# Patient Record
Sex: MALE | Race: BLACK OR AFRICAN AMERICAN | NOT HISPANIC OR LATINO | Employment: FULL TIME | URBAN - METROPOLITAN AREA
[De-identification: names, ages, dates, MRNs, and addresses within clinical notes are randomized per-mention and may not be internally consistent; named-entity substitution may affect disease eponyms.]

---

## 2024-04-30 ENCOUNTER — HOSPITAL ENCOUNTER (EMERGENCY)
Facility: HOSPITAL | Age: 39
Discharge: HOME | End: 2024-05-01
Attending: STUDENT IN AN ORGANIZED HEALTH CARE EDUCATION/TRAINING PROGRAM

## 2024-04-30 VITALS
WEIGHT: 140 LBS | HEART RATE: 100 BPM | HEIGHT: 66 IN | DIASTOLIC BLOOD PRESSURE: 90 MMHG | RESPIRATION RATE: 18 BRPM | SYSTOLIC BLOOD PRESSURE: 128 MMHG | TEMPERATURE: 99 F | OXYGEN SATURATION: 96 % | BODY MASS INDEX: 22.5 KG/M2

## 2024-04-30 DIAGNOSIS — R51.9 ACUTE NONINTRACTABLE HEADACHE, UNSPECIFIED HEADACHE TYPE: Primary | ICD-10-CM

## 2024-04-30 PROCEDURE — 99281 EMR DPT VST MAYX REQ PHY/QHP: CPT

## 2024-04-30 NOTE — Clinical Note
"Mare"Shaggy Rivera was seen and treated in our emergency department on 4/30/2024.  He may return to work on 05/02/2024.  Pt seen in this ED on 4/30-5/1, he is safe for return to work.      If you have any questions or concerns, please don't hesitate to call.      Juan David Bro PA-C"

## 2024-05-01 NOTE — ED NOTES
"Patient presents to ED secondary to intermittent right sided headache x3 days. Pain resolves with ibuprofen but comes back. Denies accompanying symptoms. 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 Refuses covid or flu swab during assessment. Denies headache at this moment. States, "I am just trying to figure out why I am having them and get a quick little check up." Upon further discussion, patient reports "I just need a doctors note to return to work because I missed Sunday because of headache." NAD ntoed.   "

## 2024-05-01 NOTE — DISCHARGE INSTRUCTIONS
Return to this ED if you experience worsening or persistent headache despite treatment.    Follow up and establish care with a local primary care provider using information provided.

## 2024-05-01 NOTE — ED PROVIDER NOTES
Encounter Date: 4/30/2024       History     Chief Complaint   Patient presents with    Headache     Patient arrives with headache on left side of head, ongoing for the past 3 days. Has been taking home meds with slight relief. Denies congestion or vision changes.     39yo M smoker presents to ED with chief complaint headache.    Admits to mild HA x 2d, took Ibuprofen with resolution of HA. States headache to L forehead. Admits to sinus pain, pressure, nasal congestion recently.  Denies fever.  No cough.  Denies history of similar headaches.  Denies worst headache of life.  No associated visual disturbance, no reported photophobia, no neck pain or stiffness.  Denies head trauma.  No exacerbating factors.  No radiation of symptoms.    Denies significant pmh  No daily rx      Review of patient's allergies indicates:  No Known Allergies  No past medical history on file.  No past surgical history on file.  No family history on file.     Review of Systems   Constitutional:  Negative for chills and fever.   HENT:  Positive for congestion, sinus pressure and sinus pain.    Eyes:  Negative for photophobia and visual disturbance.   Respiratory:  Negative for cough.    Musculoskeletal:  Negative for myalgias.   Neurological:  Positive for headaches. Negative for syncope.       Physical Exam     Initial Vitals [04/30/24 2221]   BP Pulse Resp Temp SpO2   (!) 128/90 100 18 98.9 °F (37.2 °C) 96 %      MAP       --         Physical Exam    Nursing note and vitals reviewed.  Constitutional: He appears well-developed and well-nourished. He is not diaphoretic. No distress.   HENT:   Head: Normocephalic and atraumatic.   Neck: Neck supple.   Normal range of motion.  Pulmonary/Chest: No respiratory distress.   Musculoskeletal:      Cervical back: Normal range of motion and neck supple.     Neurological: He is alert and oriented to person, place, and time. GCS score is 15. GCS eye subscore is 4. GCS verbal subscore is 5. GCS motor  subscore is 6.   Skin: Skin is warm.   Psychiatric: He has a normal mood and affect. Thought content normal.         ED Course   Procedures  Labs Reviewed - No data to display       Imaging Results    None          Medications - No data to display  Medical Decision Making  Differential diagnosis:  Headache disorder, sinusitis, tension headache, migraine    Amount and/or Complexity of Data Reviewed  Discussion of management or test interpretation with external provider(s): After discussion, patient states no longer with any headache.  He has requesting a work excuse.  He has no complaints.  He admits to resolution after taking ibuprofen.  Offered medications for what sounds like a sinus type headache, however patient states does not wish to have any medications.  He is willing to be referred to family medicine doctor for re-evaluation of any persistent symptoms.  Discussed interim return precautions with patient.  He does feel comfortable with discharge and outpatient follow-up.  Return precautions discussed.                                      Clinical Impression:  Final diagnoses:  [R51.9] Acute nonintractable headache, unspecified headache type (Primary)          ED Disposition Condition    Discharge Stable          ED Prescriptions    None       Follow-up Information       Follow up With Specialties Details Why Contact Info    [facility] -  Schedule an appointment as soon as possible for a visit  To establish primary care physician, for reevaluation [address]  [place] [address]  [phone]      [facility] Schedule an appointment as soon as possible for a visit  To establish primary care physician, For reevaluation [address] [facility] [place] [address]  [phone]               Juan David Bro, BRAYAN  05/01/24 0525

## 2024-05-20 ENCOUNTER — HOSPITAL ENCOUNTER (EMERGENCY)
Facility: HOSPITAL | Age: 39
Discharge: HOME | End: 2024-05-20
Attending: EMERGENCY MEDICINE

## 2024-05-20 VITALS
HEIGHT: 66 IN | RESPIRATION RATE: 20 BRPM | OXYGEN SATURATION: 99 % | TEMPERATURE: 99 F | DIASTOLIC BLOOD PRESSURE: 100 MMHG | BODY MASS INDEX: 21.69 KG/M2 | SYSTOLIC BLOOD PRESSURE: 163 MMHG | WEIGHT: 135 LBS | HEART RATE: 78 BPM

## 2024-05-20 DIAGNOSIS — S60.221A CONTUSION OF RIGHT HAND, INITIAL ENCOUNTER: ICD-10-CM

## 2024-05-20 DIAGNOSIS — M79.673 FOOT PAIN: ICD-10-CM

## 2024-05-20 DIAGNOSIS — M25.579 ANKLE PAIN: ICD-10-CM

## 2024-05-20 DIAGNOSIS — M87.9 OSTEONECROSIS: Primary | ICD-10-CM

## 2024-05-20 DIAGNOSIS — S93.401A SPRAIN OF RIGHT ANKLE, UNSPECIFIED LIGAMENT, INITIAL ENCOUNTER: ICD-10-CM

## 2024-05-20 PROCEDURE — 99284 EMERGENCY DEPT VISIT MOD MDM: CPT | Mod: 25

## 2024-05-20 RX ORDER — LIDOCAINE 50 MG/G
1 PATCH TOPICAL DAILY
Qty: 15 PATCH | Refills: 0 | Status: SHIPPED | OUTPATIENT
Start: 2024-05-20

## 2024-05-20 RX ORDER — METHOCARBAMOL 500 MG/1
1000 TABLET, FILM COATED ORAL 3 TIMES DAILY
Qty: 30 TABLET | Refills: 0 | Status: SHIPPED | OUTPATIENT
Start: 2024-05-20 | End: 2024-05-25

## 2024-05-20 RX ORDER — NAPROXEN 500 MG/1
500 TABLET ORAL 2 TIMES DAILY
Qty: 30 TABLET | Refills: 0 | Status: SHIPPED | OUTPATIENT
Start: 2024-05-20

## 2024-05-20 NOTE — ED TRIAGE NOTES
Pt presents to ED with complaint of right foot pain and right hand pain due a fall that occurred this morning,

## 2024-05-20 NOTE — Clinical Note
"Mare"Shaggy Rivera was seen and treated in our emergency department on 5/20/2024.  He may return to work on 05/22/2024.       If you have any questions or concerns, please don't hesitate to call.      Patrick Quinonez PA-C"

## 2024-05-20 NOTE — ED PROVIDER NOTES
Encounter Date: 5/20/2024       History     Chief Complaint   Patient presents with    Fall     Pt reports losing his balance and falling this morning. Pt reports right foot pain and right hand pain. Pt denies head injury/trauma or LOC.      The history is provided by the patient and medical records.   39-year-old male no pertinent past medical history presenting to the emergency department today complaining right foot and ankle and right hand pain after a trip and fall this morning.  Patient states he tripped and fell landing on his right hand and twisting his ankle.  Denies head injury, loss of consciousness, nausea or vomiting.  Denies any medication prior to arrival.  Denies any fever, chest pain, shortness for breath, headache, dizziness, lightheadedness, weakness, numbness or tingling.  Review of patient's allergies indicates:  No Known Allergies  No past medical history on file.  No past surgical history on file.  No family history on file.     Review of Systems   Constitutional:  Negative for chills, diaphoresis, fatigue and fever.   HENT:  Negative for congestion, ear discharge, ear pain, rhinorrhea, sore throat and trouble swallowing.    Eyes:  Negative for photophobia, redness and visual disturbance.   Respiratory:  Negative for cough and shortness of breath.    Cardiovascular:  Negative for chest pain, palpitations and leg swelling.   Gastrointestinal:  Negative for abdominal pain, diarrhea, nausea and vomiting.   Genitourinary:  Negative for difficulty urinating, dysuria, flank pain, frequency and hematuria.   Musculoskeletal:  Positive for arthralgias (Right ankle) and myalgias (Right foot, right hand). Negative for back pain, gait problem, joint swelling, neck pain and neck stiffness.   Skin:  Negative for color change, pallor, rash and wound.   Neurological:  Negative for dizziness, syncope, weakness, light-headedness, numbness and headaches.   Hematological:  Does not bruise/bleed easily.        Physical Exam     Initial Vitals [05/20/24 1320]   BP Pulse Resp Temp SpO2   (!) 163/100 78 20 98.5 °F (36.9 °C) 99 %      MAP       --         Physical Exam    Nursing note and vitals reviewed.  Constitutional: He appears well-developed and well-nourished. He is not diaphoretic. He does not appear ill. No distress.   HENT:   Head: Normocephalic and atraumatic. Head is without raccoon's eyes, without Segura's sign, without abrasion, without contusion and without laceration.   Right Ear: Tympanic membrane, external ear and ear canal normal.   Left Ear: Tympanic membrane, external ear and ear canal normal.   Nose: Nose normal. No rhinorrhea, sinus tenderness, nasal deformity, septal deviation or nasal septal hematoma. No epistaxis.   Mouth/Throat: Uvula is midline, oropharynx is clear and moist and mucous membranes are normal.   Eyes: Conjunctivae and EOM are normal. Pupils are equal, round, and reactive to light. Right eye exhibits no discharge. Left eye exhibits no discharge. No scleral icterus.   Neck: Trachea normal. Neck supple.   Normal range of motion.   Full passive range of motion without pain.     Cardiovascular:  Normal rate, regular rhythm, normal heart sounds, intact distal pulses and normal pulses.     Exam reveals no distant heart sounds and no friction rub.       Pulmonary/Chest: Effort normal and breath sounds normal. No respiratory distress. He exhibits no tenderness and no bony tenderness.   Abdominal: Abdomen is soft. Bowel sounds are normal. He exhibits no distension, no pulsatile midline mass and no mass. There is no splenomegaly. There is no abdominal tenderness.   No right CVA tenderness.  No left CVA tenderness. There is no rebound and no guarding.   Musculoskeletal:      Right shoulder: Normal.      Left shoulder: Normal.      Right elbow: Normal.      Left elbow: Normal.      Right wrist: Normal.      Left wrist: Normal.      Right hand: Bony tenderness (1st MCP) present. No swelling,  deformity, lacerations or tenderness. Decreased range of motion (Secondary to pain, full passive range of motion). Normal strength. Normal sensation. There is no disruption of two-point discrimination. Normal capillary refill. Normal pulse.      Left hand: Normal.      Cervical back: Normal, full passive range of motion without pain, normal range of motion and neck supple. No edema, erythema or rigidity. No spinous process tenderness or muscular tenderness. Normal range of motion.      Thoracic back: Normal.      Lumbar back: Normal.      Right hip: Normal.      Left hip: Normal.      Right knee: Normal.      Left knee: Normal.      Right lower leg: Normal.      Left lower leg: Normal.      Right ankle: Normal.      Right Achilles Tendon: Normal.      Left ankle: Normal.      Left Achilles Tendon: Normal.      Right foot: Normal range of motion and normal capillary refill. Tenderness (dorsal and plantar) present. No swelling, deformity, bunion, Charcot foot, foot drop, prominent metatarsal heads, laceration, bony tenderness or crepitus. Normal pulse.      Left foot: Normal.      Comments: Focused exam of the right hand:  Tenderness of the 1st MCP full passive range of motion sensation intact good cap refill.    Focused exam of the right ankle and foot:  Tenderness to the dorsal and plantar aspect of the foot pain with range of motion although full range without limitation.  5/5 strength.  2+ distal pulses.  Sensation intact.    Otherwise full range of motion of all other upper and lower extremities bilaterally neurovascular intact 2+ distal pulses 5/5 strength.     Neurological: He is alert and oriented to person, place, and time. He has normal strength. No cranial nerve deficit or sensory deficit. He displays a negative Romberg sign. Coordination and gait normal.   Skin: Skin is warm and dry. Capillary refill takes less than 2 seconds. No bruising, no ecchymosis and no rash noted. No erythema.   Psychiatric: He has  a normal mood and affect. His speech is normal and behavior is normal. Thought content normal.         ED Course   Procedures  Labs Reviewed - No data to display       Imaging Results              X-Ray Foot Complete Right (Final result)  Result time 05/20/24 14:04:11      Final result by George Marcus Jr., MD (05/20/24 14:04:11)                   Impression:      Findings suggesting possibility of osteonecrosis of the 1st metatarsal head.  Correlate clinically and consider MRI for further evaluation      Electronically signed by: George Jay Jr  Date:    05/20/2024  Time:    14:04               Narrative:    EXAMINATION:  XR FOOT COMPLETE 3 VIEW RIGHT    CLINICAL HISTORY:  Pain in unspecified foot    TECHNIQUE:  XR FOOT COMPLETE 3 VIEW RIGHT    COMPARISON:  None    FINDINGS:  Joint spaces are preserved.  There is some flattening and subchondral lucency involving the 1st metatarsal head.  There is no evidence of fracture or soft tissue swelling.                                       X-Ray Hand 3 view Right (Final result)  Result time 05/20/24 14:04:06      Final result by Yovani Gonsales MD (05/20/24 14:04:06)                   Impression:      No acute displaced fracture-dislocation identified.      Electronically signed by: Yovani Gonsales MD  Date:    05/20/2024  Time:    14:04               Narrative:    EXAMINATION:  XR HAND COMPLETE 3 VIEW RIGHT    CLINICAL HISTORY:  1st MCP pain;    TECHNIQUE:  PA, lateral, and oblique views of the right hand were performed.    COMPARISON:  None    FINDINGS:  Bones are well mineralized. Overall alignment is within normal limits. No acute displaced fracture, dislocation or destructive osseous process. Joint spaces appear relatively maintained. No subcutaneous emphysema or radiopaque foreign body.                                       X-Ray Ankle Complete Right (Final result)  Result time 05/20/24 14:04:52      Final result by Yovani Gonsales MD (05/20/24 14:04:52)                    Impression:      No acute displaced fracture-dislocation identified.      Electronically signed by: Yovani Gonsales MD  Date:    05/20/2024  Time:    14:04               Narrative:    EXAMINATION:  XR ANKLE COMPLETE 3 VIEW RIGHT    CLINICAL HISTORY:  Pain in unspecified ankle and joints of unspecified foot    TECHNIQUE:  AP, lateral, and oblique images of the right ankle were performed.    COMPARISON:  None    FINDINGS:  Bones are well mineralized. Overall alignment is within normal limits.  Ankle mortise appears intact.  No acute displaced fracture, dislocation or destructive osseous process. Joint spaces appear relatively maintained. No subcutaneous emphysema or radiopaque foreign body.                                       Medications - No data to display  Medical Decision Making  39-year-old male no pertinent past medical history presenting to the emergency department today complaining right foot and ankle and right hand pain after a trip and fall this morning.  Patient states he tripped and fell landing on his right hand and twisting his ankle.  Denies head injury, loss of consciousness, nausea or vomiting.  Denies any medication prior to arrival.  Denies any fever, chest pain, shortness for breath, headache, dizziness, lightheadedness, weakness, numbness or tingling.  Patient's chart and medical history reviewed.  Patient's vitals reviewed.  They are afebrile, no respiratory distress, nontoxic-appearing in the ED.  Differential diagnosis is considered the following.  - Septic Arthritis, Gout, Osteomyelitis: considered with pain, although unlikely without overlying erythema and swelling/edema, patient is afebrile  - Fracture/Dislocation: considered with pain, imaging ordered for further evaluation  - Contusion/Sprain/Strain: considered with pain with ROM   - Compartment Syndrome: unlikely with 2+ distal pulses, no pallor, no paresthesias, no woody induration.   Physical exam as noted above.   X-ray with no acute fracture or dislocation.  Noted possible suspect osteonecrosis of the 1st MCP of the foot.  Patient with no tenderness in his area on exam with full range of motion sensation intact.  Discussed following up with a primary care physician for re-evaluation of this future care.  Patient will be discharged home with muscle relaxers and NSAIDs.  Patient agrees with the plan does have any questions at this time.  Patient's hands and ankle Ace wrapped for compression discussed rice therapy.  At this time I'll discharge home to follow up with primary care physician in the next 1-2 days for further evaluation.  If the pain continues the pt will need to see Ortho for further evaluation.  The patient is comfortable with this plan and comfortable going home at this time. After taking into careful account the historical factors and physical exam findings of the patient's presentation today, in conjunction with the empirical and objective data obtained on ED workup, no acute emergent medical condition has been identified. The patient appears to be low risk for an emergent medical condition and I feel it is safe and appropriate at this time for the patient to be discharged to follow-up as detailed in their discharge instructions for reevaluation and possible continued outpatient workup and management. I have discussed the specifics of the workup with the patient and the patient has verbalized understanding of the details of the workup, the diagnosis, the treatment plan, and the need for outpatient follow-up.  Although the patient has no emergent etiology today this does not preclude the development of an emergent condition so in addition, I have advised the patient that they can return to the ED and/or activate EMS at any time with worsening of their symptoms, change of their symptoms, or with any other medical complaint.  The patient remained comfortable and stable during their visit in the ED.  Discharge and  follow-up instructions discussed with the patient who expressed understanding and willingness to comply with my recommendations. I discussed with the patient/family the diagnosis, treatment plan, indications for return to the emergency department, and for expected follow-up. Please follow up with your primary doctor in 1-2 days and return to the ED in any new, worsening, or continued symptoms. The patient/family verbalized an understanding. The patient/family is asked if there are any questions or concerns. We discuss the case, until all issues are addressed to the patient/family's satisfaction. Patient/family understands and is agreeable to the plan.   PATRICK YEE    DISCLAIMER: This note was prepared with xAd voice recognition transcription software. Garbled syntax, mangled pronouns, and other bizarre constructions may be attributed to that software system.      Amount and/or Complexity of Data Reviewed  Radiology: ordered.    Risk  Prescription drug management.                                      Clinical Impression:  Final diagnoses:  [M79.673] Foot pain  [M25.579] Ankle pain  [M87.9] Osteonecrosis (Primary)  [S93.401A] Sprain of right ankle, unspecified ligament, initial encounter  [S60.221A] Contusion of right hand, initial encounter          ED Disposition Condition    Discharge Stable          ED Prescriptions       Medication Sig Dispense Start Date End Date Auth. Provider    methocarbamoL (ROBAXIN) 500 MG Tab Take 2 tablets (1,000 mg total) by mouth 3 (three) times daily. for 5 days 30 tablet 5/20/2024 5/25/2024 Patrick Yee PA-C    LIDOcaine (LIDODERM) 5 % Place 1 patch onto the skin once daily. Apply patch for 12 hours and then leave off for 12 hours 15 patch 5/20/2024 -- Patrick Yee PA-C    naproxen (NAPROSYN) 500 MG tablet Take 1 tablet (500 mg total) by mouth 2 (two) times daily. 30 tablet 5/20/2024 -- Patrick Yee PA-C          Follow-up Information       Follow up With  Specialties Details Why Contact Info    [facility] -  Schedule an appointment as soon as possible for a visit in 1 day for follow up [address]  [place] [address]  [phone]               Patrick Quinonez PA-C  05/20/24 1411       Patrick Quinonez PA-C  05/20/24 1421

## 2024-05-20 NOTE — DISCHARGE INSTRUCTIONS
For muscular pain please apply a compressive ACE bandage. Rest and elevate the affected painful area.  Apply cold compresses intermittently as needed.  As pain recedes, begin normal activities slowly as tolerated.      If you have been prescribed Ibuprofen or Tylenol, these medications may be used for pain every 6-8 hours. Do not exceed a dose of 800 mg of Ibuprofen or a dose of 1000 mg of Tylenol in this 6-8 hour period. Please stop taking these medications if you experience: weakness, itching, yellow skin or eyes, joint pains, vomiting blood, blood or black stools, unusual weight gain, or swelling in your arms, legs, hands, or feet.     If you have been prescribed Naproxen for pain. This is an Non-Steroidal Anti-Inflammatory (NSAID) Medication. Please do not take any additional NSAIDs while you are taking this medication including (Advil, Aleve, Motrin, Ibuprofen, Mobic\meloxicam, Naprosyn, Toradol, ketoralac, etc.). Please stop taking this medication if you experience: weakness, itching, yellow skin or eyes, joint pains, vomiting blood, blood or black stools, unusual weight gain, or swelling in your arms, legs, hands, or feet.     You have been prescribed Robaxin (Methocarbamol) for muscle spasms/pain. Please do not take this medication while working, drinking alcohol, swimming, or while driving/operating heavy machinery. This medication may cause drowsiness, dizziness, impair judgment, and reduce physical capabilities.You should not drive, operate heavy machinery, or make life changing decisions while taking this medication.    If you were prescribed a medication such as Norco or Percocet remember that this medication contains Tylenol. Please do not take any additional Tylenol while you are taking this medication.     Thank you for coming to our Emergency Department today. It is important to remember that some problems or medical conditions are difficult to diagnose and may not be found or addressed during your  Emergency Department visit.  These conditions often start with non-specific symptoms and can only be diagnosed on follow up visits with your primary care physician or specialist when the symptoms continue or change. Please remember that all medical conditions can change, and we cannot predict how you will be feeling tomorrow or the next day. Return to the ER with any questions/concerns, new/concerning symptoms, worsening or failure to improve. Also, please follow up with your Primary Care Physician and/or Pediatrician in the next 1-2 days to review your ED visit in entirety and for re-evaluation.   Be sure to follow up with your primary care doctor and review all labs/imaging/tests that were performed during your ER visit with them. It is very common for us to identify non-emergent incidental findings which must be followed up with your primary care physician.  Some labs/imaging/tests may be outside of the normal range, and require non-emergent follow-up and/or further investigation/treatment/procedures/testing to help diagnose/exclude/prevent complications or other potentially serious medical conditions. Some abnormalities may not have been discussed or addressed during your ER visit. Some lab results may not return during your ER visit but can be accessible by downloading the free [facility] or by visiting [email or web address] . It is important for you to review all labs/imaging/tests which are outside of the normal range with your physician.  An ER visit does not replace a primary care visit, and many screening tests or follow-up tests cannot be ordered by an ER doctor or performed by the ER. Some tests may even require pre-approval.  If you do not have a primary care doctor, you may contact the one listed on your discharge paperwork or you may also call the [facility] Appointment Desk at [phone] , or [address] [place] [address] at  [phone] to schedule an appointment, or establish care with a primary  care doctor or even a specialist and to obtain information about local resources. It is important to your health that you have a primary care doctor.  Please take all medications as directed. We have done our best to select a medication for you that will treat your condition however, all medications may potentially have side-effects and it is impossible to predict which medications may give you side-effects or what those side-effects (if any) those medications may give you.  If you feel that you are having a negative effect or side-effect of any medication you should stop taking those medications immediately and seek medical attention. If you feel that you are having a life-threatening reaction call 911.  Do not drive, swim, climb to height, take a bath, operate heavy machinery, drink alcohol or take potentially sedating medications, sign any legal documents or make any important decisions for 24 hours if you have received any pain medications, sedatives or mood altering drugs during your ER visit or within 24 hours of taking them if they have been prescribed to you.   You can find additional resources for Dentists, hearing aids, durable medical equipment, low cost pharmacies and other resources at [email or web address]  Patient agrees with this plan. Discussed with her strict return precautions, they verbalized understanding. Patient is stable for discharge.